# Patient Record
(demographics unavailable — no encounter records)

---

## 2018-01-02 NOTE — ER DOCUMENT REPORT
ED Headache





- General


Chief Complaint: Headache


Stated Complaint: HEADACHE


Time Seen by Provider: 01/02/18 11:22


Mode of Arrival: Ambulatory


Information source: Patient, [facility] Records


Notes: 





This 36-year-old female patient has history of migraines with hemiaplasia, 

hypertension, hyperlipidemia, coronary artery disease possibly, insulin-

dependent diabetes.


She reports 2 day history of severe headache with vomiting.  She states her 

blood sugars have been okay.


TRAVEL OUTSIDE OF THE [place] IN LAST 30 DAYS: No





- Related Data


Allergies/Adverse Reactions: 


 





No Known Allergies Allergy (Verified 01/02/18 10:52)


 











Past Medical History





- General


Information source: Patient, [facility] Records





- Social History


Smoking Status: Never Smoker


Cigarette use (# per day): No


Chew tobacco use (# tins/day): No


Smoking Education Provided: No


Frequency of alcohol use: None


Drug Abuse: None


Lives with: Family


Family History: Reviewed & Not Pertinent, Other - Mother had migraines


Patient has suicidal ideation: No


Patient has homicidal ideation: No





- Past Medical History


Cardiac Medical History: Reports: Hx Coronary Artery Disease, Hx 

Hypercholesterolemia, Hx Hypertension


Pulmonary Medical History: Reports: Hx Asthma


Neurological Medical History: Reports: Hx Cerebrovascular Accident


Endocrine Medical History: Reports: Hx Diabetes Mellitus Type 1


Renal/ Medical History: Reports: None


GI Medical History: Reports: None


Musculoskeltal Medical History: Reports None


Skin Medical History: Reports Hx Eczema


Psychiatric Medical History: Reports: None


Past Surgical History: Reports: Hx Oral Surgery





- Immunizations


Immunizations up to date: Yes


Hx Diphtheria, Pertussis, Tetanus Vaccination: Yes - 2011


Hx Pneumococcal Vaccination: 01/01/01





Review of Systems





- Review of Systems


Constitutional: No symptoms reported


EENT: No symptoms reported


Cardiovascular: No symptoms reported


Respiratory: No symptoms reported


Gastrointestinal: See HPI, Vomiting - Vomiting related to the headache


Genitourinary: No symptoms reported


Female Genitourinary: No symptoms reported


Musculoskeletal: No symptoms reported


Skin: Other - Does have some eczema


Hematologic/Lymphatic: No symptoms reported


Neurological/Psychological: Headaches





Physical Exam





- Vital signs


Vitals: 


 











Temp Pulse Resp BP Pulse Ox


 


 98.6 F   106 H  16   143/93 H  98 


 


 01/02/18 10:56  01/02/18 10:56  01/02/18 10:56  01/02/18 10:56  01/02/18 10:56











Interpretation: Normal





- HEENT


Head: Normocephalic, Atraumatic, Tenderness - Some tenderness to palpate the 

frontal and temporal scalp


Eyes: Normal


Pupils: PERRL


Neck: Supple, Other - There is minimal tenderness to palpate the posterior 

cervical muscles





- Respiratory


Respiratory status: No respiratory distress


Breath sounds: Normal





- Cardiovascular


Rhythm: Regular


Heart sounds: Normal auscultation


Murmur: No





- Abdominal


Inspection: Normal





- Back


Back: Normal





- Extremities


General upper extremity: Normal inspection


General lower extremity: Normal inspection





- Neurological


Neuro grossly intact: Yes





- Psychological


Associated symptoms: Normal affect, Normal mood





Course





- Re-evaluation


Re-evalutation: 





01/02/18 12:37


Patient is much more comfortable now reports her headache has eased off.


She will be given a prescription for Compazine and instructions about taking 

Benadryl, Compazine, and 2 Aleve with large glass of water and perhaps some 

caffeine next time she gets a headache like this.





- Vital Signs


Vital signs: 


 











Temp Pulse Resp BP Pulse Ox


 


 98.6 F   106 H  16   143/93 H  98 


 


 01/02/18 10:56  01/02/18 10:56  01/02/18 10:56  01/02/18 10:56  01/02/18 10:56














Discharge





- Discharge


Clinical Impression: 


Migraine headache


Qualifiers:


 Migraine type: unspecified Status migrainosus presence: with status 

migrainosus Intractability: not intractable Qualified Code(s): G43.901 - 

Migraine, unspecified, not intractable, with status migrainosus





Condition: Stable


Disposition: HOME, SELF-CARE


Additional Instructions: 


Migraine Headache:





     The physician feels that your symptoms are due to a migraine attack.  

Migraines are caused by changes in the blood vessels of the head.  Arteries go 

into spasm, often causing warning symptoms that a headache may begin soon.  As 

the spasm goes away, the vessels dilate and throb, causing the pounding pain of 

a migraine headache. Migraines often cause nausea and vomiting.


     The treatment of headaches varies with severity and cause of pain. Not all 

headaches need pain shots -- in fact, there is evidence that using narcotics 

for headaches may make them worse in the long run.  The physician will 

determine the therapy that's in your best interest for this particular headache.


     Medications are available that may prevent migraines, or stop them as they 

first occur.  If one medication is not helpful, try another.  If migraines are 

frequent, be patient -- follow the doctor's recommendations.


     Call the physician if you are worsening, or if new symptoms arise.











////////////////////////////////////////////////////////////////////////////////

////////////////////////////////////////////////////////////





Drink plenty of fluids today and get plenty of rest.





Take the Compazine as prescribed if needed for headache along with 1 or 2 

Benadryl tablets, 2 Aleve tablets and some caffeine perhaps from a cup of 

coffee or soda or tea to treat the headache if it returns.





Follow-up with local medical doctor if not improving.





RETURN TO THE EMERGENCY ROOM IF ANY NEW OR WORSENING SYMPTOMS.








Prescriptions: 


Prochlorperazine Maleate [Compazine 10 mg Tablet] 10 mg PO ASDIR PRN #10 tablet


 PRN Reason:

## 2018-01-04 NOTE — ER DOCUMENT REPORT
ED Medical Screen (RME)





- General


Chief Complaint: Headache >24 hrs old


Stated Complaint: HEADACHE


Time Seen by Provider: 01/04/18 14:21


TRAVEL OUTSIDE OF THE [place] IN LAST 30 DAYS: No





- HPI


Patient complains to provider of: Headache 10/10


Notes: 





01/04/18 14:24


Fortunately young woman with history of stroke presents with 10/10 headache.  

She had a migraine that started 2 days ago was seen in our emergency 

department.  Was given medications here and released improved.  Returns again 

with headache throbbing in nature nausea vomiting no diarrhea no head trauma.





No focal motor defects.





- Related Data


Allergies/Adverse Reactions: 


 





No Known Allergies Allergy (Verified 01/04/18 13:59)


 











Past Medical History





- Past Medical History


Cardiac Medical History: Reports: Hx Coronary Artery Disease, Hx 

Hypercholesterolemia, Hx Hypertension


Pulmonary Medical History: Reports: Hx Asthma


Neurological Medical History: Reports: Hx Cerebrovascular Accident


Endocrine Medical History: Reports: Hx Diabetes Mellitus Type 1


Renal/ Medical History: Denies: Hx Peritoneal Dialysis


Skin Medical History: Reports Hx Eczema


Past Surgical History: Reports: Hx Oral Surgery.  Denies: Hx Hysterectomy





- Immunizations


Immunizations up to date: Yes


Hx Diphtheria, Pertussis, Tetanus Vaccination: Yes - 2011





Review of Systems





- Review of Systems


Gastrointestinal: Nausea, Vomiting


Neurological/Psychological: Headaches





Physical Exam





- Vital signs


Vitals: 





 











Temp Pulse Resp BP Pulse Ox


 


 99.1 F   106 H  14   156/99 H  100 


 


 01/04/18 13:58  01/04/18 13:58  01/04/18 13:58  01/04/18 13:58  01/04/18 13:58














Course





- Re-evaluation


Re-evalutation: 





01/04/18 14:26


Patient with history of stroke presents with 10/10 headache, nausea vomiting.





- Vital Signs


Vital signs: 





 











Temp Pulse Resp BP Pulse Ox


 


 99.1 F   106 H  14   156/99 H  100 


 


 01/04/18 13:58  01/04/18 13:58  01/04/18 13:58  01/04/18 13:58  01/04/18 13:58

## 2018-01-04 NOTE — ER DOCUMENT REPORT
ED Headache





- General


Mode of Arrival: Ambulatory


Information source: Patient


TRAVEL OUTSIDE OF THE [place] IN LAST 30 DAYS: No





- HPI


Patient complains to provider of: Headache


Patient reports: Occasional migraines, Prior CVA


Onset: This afternoon


Onset was: Gradual


Timing: Still present


Quality of pain: Sharp, Throbbing


Pain Level: 5


Associated symptoms: Nausea/vomiting.  denies: Confusion, Double/blurred vision

, Fever, Lightheaded, Neck pain, Stiff neck, Trouble walking


Exacerbated by: denies: Light, Noise


Similar symptoms previously: Yes


Recently seen / treated by doctor: Yes





<KEENA SCHAFER - Last Filed: 01/04/18 19:24>





<ANGEL NAVARRETE - Last Filed: 01/04/18 20:35>





- General


Chief Complaint: Headache >24 hrs old


Stated Complaint: HEADACHE


Time Seen by Provider: 01/04/18 14:21


Notes: 





Patient complains of right-sided headache pain that started 2 days ago.  

Patient was seen here for this at that time and received pain medication.  

Patient states that headache pain resolved and then returned this afternoon.  

Patient does report nausea with vomiting 4 episodes today.  Patient denies any 

fever or head injury.  Patient's mother is concerned that patient has a history 

of stroke in the past and that with her previous strokes 4 she always had 

headache pain.  Mother states that patient has been followed by multiple 

providers at [facility], [place], [facility] as well as [facility].  Patient states that some people say she had strokes, mother states 

that other providers that she did not have strokes, patient's mother states 

that patient is currently being treated for MS, but she does not believe that 

patient has this.  Mother states that patient's had chronic body pains as well 

as weakness due to her strokes in the past. (KEENA SCHAFER)





- Related Data


Allergies/Adverse Reactions: 


 





No Known Allergies Allergy (Verified 01/04/18 13:59)


 











Past Medical History





- General


Information source: Patient, Parent





- Social History


Smoking Status: Current Every Day Smoker


Frequency of alcohol use: None


Drug Abuse: None


Occupation: Call center


Lives with: Family


Family History: Reviewed & Not Pertinent, Other - Mother had migraines


Patient has suicidal ideation: No


Patient has homicidal ideation: No





- Past Medical History


Cardiac Medical History: Reports: Hx Coronary Artery Disease, Hx 

Hypercholesterolemia, Hx Hypertension


Pulmonary Medical History: Reports: Hx Asthma


Neurological Medical History: Reports: Hx Cerebrovascular Accident, Hx Migraine


Endocrine Medical History: Reports: Hx Diabetes Mellitus Type 1


Renal/ Medical History: Denies: Hx Peritoneal Dialysis


Skin Medical History: Reports Hx Eczema


Past Surgical History: Reports: Hx Oral Surgery.  Denies: Hx Hysterectomy





- Immunizations


Immunizations up to date: Yes


Hx Diphtheria, Pertussis, Tetanus Vaccination: Yes - 2011


Hx Pneumococcal Vaccination: 01/01/01





<HANHKEENA MONTERO - Last Filed: 01/04/18 19:24>





Review of Systems





- Review of Systems


Constitutional: No symptoms reported.  denies: Fever, Recent illness


EENT: No symptoms reported.  denies: Blurred vision


Cardiovascular: No symptoms reported.  denies: Chest pain, Dizziness


Respiratory: No symptoms reported


Gastrointestinal: Nausea, Vomiting.  denies: Abdominal pain


Genitourinary: No symptoms reported


Female Genitourinary: No symptoms reported


Musculoskeletal: No symptoms reported.  denies: Back pain, Neck pain


Skin: No symptoms reported.  denies: Rash


Hematologic/Lymphatic: No symptoms reported


Neurological/Psychological: Weakness - Chronic after having prior CVAs, 

Headaches.  denies: Confusion, Lost consciousness





<HANH,ESPERANZADONALDO - Last Filed: 01/04/18 19:24>





Physical Exam





- General


General appearance: Alert


In distress: Mild





- HEENT


Head: Normocephalic, Atraumatic.  No: [place]'s eyes, Tenderness


Eyes: Normal


Conjunctiva: Normal


Extraocular movements intact: Yes


Eyelashes: Normal


Pupils: PERRL


Ears: Normal


External canal: Normal


Tympanic membrane: Normal


Sinus: Normal


Nasal: Normal


Mouth/Lips: Normal


Mucous membranes: Normal


Pharynx: Normal.  No: Erythema


Neck: Normal, Supple.  No: Lymphadenopathy, Meningismus





- Respiratory


Respiratory status: No respiratory distress


Chest status: Nontender


Breath sounds: Normal.  No: Rales, Rhonchi, Stridor, Wheezing


Chest palpation: Normal





- Cardiovascular


Rhythm: Regular


Heart sounds: S1 appreciated, S2 appreciated


Murmur: No





- Abdominal


Inspection: Normal





- Back


Back: Normal, Nontender.  No: CVA tenderness





- Extremities


General upper extremity: Normal inspection, Normal strength


General lower extremity: Normal inspection, Normal strength





- Neurological


Neuro grossly intact: Yes


Cognition: Normal


Orientation: AAOx4


[facility] Coma Scale Eye Opening: Spontaneous


[place] Coma Scale Verbal: Oriented


[place] Coma Scale Motor: Obeys Commands


[facility] Coma Scale Total: 15


Speech: Normal.  No: Dysarthria


Cranial nerves: Normal.  No: Facial palsy, Tongue deviation


Cerebellar coordination: Normal, Heel-shin, Rapid alt. movements


Motor strength normal: LUE, RUE, LLE, RLE





- Psychological


Associated symptoms: Normal affect, Normal mood





- Skin


Skin Temperature: Warm


Skin Moisture: Dry


Skin Color: Normal





<KEENA SCHAFER - Last Filed: 01/04/18 19:24>





- Vital signs


Vitals: 





 











Temp Pulse Resp BP Pulse Ox


 


 99.1 F   106 H  14   156/99 H  100 


 


 01/04/18 13:58  01/04/18 13:58  01/04/18 13:58  01/04/18 13:58  01/04/18 13:58














Course





- Laboratory


Result Diagrams: 


 01/04/18 14:45





 01/04/18 16:53





- Diagnostic Test


Radiology reviewed: Reports reviewed





<KEENA SCHAFER - Last Filed: 01/04/18 19:24>





- Laboratory


Result Diagrams: 


 01/04/18 14:45





 01/04/18 16:53





<ANGEL NAVARRETE - Last Filed: 01/04/18 20:35>





- Re-evaluation


Re-evalutation: 





01/04/18 17:03


Consulted with Dr. Little regarding patient presentation reviewed patient's CT 

report findings.  Does not recommend any additional imaging given normal 

neurologic exam.  Recommend outpatient follow-up with neurologist


01/04/18 18:05


Patient reports that headache pain is starting to improve.  RN states that 

patient is now starting to develop a fever.  Additional medications ordered.  

Discussed patient's hyperglycemia.  Mother states that patient has not had any 

of her diabetic medications today.


01/04/18 18:26


Dr Little bedside for examination.  No concern for meningitis at this time, no 

meningismus.  Negative Kernig and Brudzinski.  Agrees with plan for IV 

hydration as well as checking a urinalysis and flu test at this time.


01/04/18 18:30





01/04/18 19:24


Bedside report and handout given to Angel DOMINIQUE (KEENA SCHAFER)








01/04/18 20:30


Patient alert, well-appearing, states she feels much better.  She is asking for 

a single dose of something so she can go home and rest, she denies any 

significant headache, she denies any neck stiffness, she denies any current 

symptoms other than feeling tired.  Blood glucose on the 300s, patient given 

hydration, she has insulin and ability to check and self treat at home, anion 

gap and bicarbonate are normal.  Urine does not show infection, influenza 

negative, chest x-ray does not show pneumonia, no sore throat, soft abdomen, 

probably viral source of infection.  No meningismus suggesting meningitis.  No 

current symptoms suggesting meningitis.  Patient states she will follow closely 

with her neurologist, discussed fever treatment, blood glucose treatment, 

headache treatment, and return precautions.  Patient and mom state satisfaction 

and agreement. (ANGEL NAVARRETE)





- Vital Signs


Vital signs: 





 











Temp Pulse Resp BP Pulse Ox


 


 100.4 F   107 H  18   128/78 H  96 


 


 01/04/18 20:13  01/04/18 20:13  01/04/18 20:13  01/04/18 20:13  01/04/18 20:13














- Laboratory


Laboratory results interpreted by me: 





 











  01/04/18 01/04/18 01/04/18





  14:45 16:53 18:50


 


WBC  11.9 H  


 


Seg Neutrophils %  81.4 H  


 


Absolute Neutrophils  9.7 H  


 


Sodium   135.7 L 


 


Glucose   343 H 


 


POC Glucose   


 


Creatine Kinase   28 L 


 


Urine Glucose (UA)    >=500 H


 


Urine Ketones    20 H














  01/04/18





  20:02


 


WBC 


 


Seg Neutrophils % 


 


Absolute Neutrophils 


 


Sodium 


 


Glucose 


 


POC Glucose  348 H


 


Creatine Kinase 


 


Urine Glucose (UA) 


 


Urine Ketones 














Discharge





<KEENA SCHAFER - Last Filed: 01/04/18 19:24>





<ANGEL NAVARRETE - Last Filed: 01/04/18 20:35>





- Discharge


Clinical Impression: 


 Hx of diabetes mellitus





Headache


Qualifiers:


 Headache type: unspecified Headache chronicity pattern: episodic headache 

Intractability: not intractable Qualified Code(s): R51 - Headache





Fever


Qualifiers:


 Fever type: unspecified Qualified Code(s): R50.9 - Fever, unspecified





Instructions:  Acetaminophen, Fever ([facility]), Headache ([facility]), Hyperglycemia ([facility]), 

Toradol Injection ([facility]), Viral Syndrome ([facility])


Additional Instructions: 


Return immediately for any new or worsening symptoms





Followup with your primary care provider, call tomorrow to make a followup 

appointment





Follow-up with your neurologist for further evaluation, call tomorrow for an 

appointment





Stay well-hydrated





Take your diabetic medications as prescribed








Prescriptions: 


Butalb/Acetaminophen/Caffeine [Fioricet (-40 mg) Tablet] 1 - 2 tab PO Q4H 

PRN #12 each


 PRN Reason: 


Naproxen [Naprosyn 250 Nmg Tablet] 1 tab PO BID #14 tablet


Forms:  Return to Work


Referrals: 


SAVANAH MCCORD MD [Primary Care Provider] - Follow up tomorrow

## 2018-01-04 NOTE — RADIOLOGY REPORT (SQ)
EXAM DESCRIPTION:  CT HEAD WITHOUT



COMPLETED DATE/TIME:  1/4/2018 3:44 pm



REASON FOR STUDY:  HA



COMPARISON:  Prior studies from 2016 and 2015.  Most recent prior is dated 11/20/2016.



TECHNIQUE:  Axial images acquired through the brain without intravenous contrast.  Images reviewed wi
th bone, brain and subdural windows.  Images stored on PACS.

All CT scanners at this facility use dose modulation, iterative reconstruction, and/or weight based d
osing when appropriate to reduce radiation dose to as low as reasonably achievable (ALARA).

[facility]: Dose Right  [facility]: CareDose    [facility]: Dose Right    [facility]: Teradose 4D    [facility]: [employer]



RADIATION DOSE:  CT Rad equipment meets quality standard of care and radiation dose reduction techniq
ues were employed. CTDIvol: 67.0 mGy. DLP: 1316 mGy-cm. mGy.



LIMITATIONS:  None.



FINDINGS:  VENTRICLES: Normal size and contour.

CEREBRUM: Stable patchy areas of periventricular low density.  Nonprogressive over time. No developin
g mass or hemorrhage or shift or hydrocephalus.

CEREBELLUM: No masses.  No hemorrhage.  No alteration of density.  No evidence for acute infarction.

EXTRAAXIAL SPACES: No fluid collections.  No masses.

ORBITS AND GLOBE: No intra- or extraconal masses.  Normal contour of globe without masses.

CALVARIUM: No fracture.

PARANASAL SINUSES: No fluid or mucosal thickening.

SOFT TISSUES: No mass or hematoma.

OTHER: No other significant finding.



IMPRESSION:  1. Nonprogressive white matter changes, chronic.  No acute or suspicious abnormality.  S
table appearance of the brain.

EVIDENCE OF ACUTE STROKE: NO.



COMMENT:  Quality ID # [id number]: Final reports with documentation of one or more dose reduction techniques
 (e.g., Automated exposure control, adjustment of the mA and/or kV according to patient size, use of 
iterative reconstruction technique)



TECHNICAL DOCUMENTATION:  JOB ID:  [id number]

 2011 [employer]- All Rights Reserved

## 2018-01-04 NOTE — RADIOLOGY REPORT (SQ)
EXAM DESCRIPTION:  CHEST SINGLE VIEW



COMPLETED DATE/TIME:  1/4/2018 3:48 pm



REASON FOR STUDY:  HA, hx stroke



COMPARISON:  11/28/2015



EXAM PARAMETERS:  NUMBER OF VIEWS: One view.

TECHNIQUE: Single frontal radiographic view of the chest acquired.

RADIATION DOSE: NA

LIMITATIONS: None.



FINDINGS:  LUNGS AND PLEURA: No opacities, masses or pneumothorax. No pleural effusion.

MEDIASTINUM AND HILAR STRUCTURES: No masses.  Contour normal.

HEART AND VASCULAR STRUCTURES: Heart normal in size.  Normal vasculature.

BONES: No acute findings.

HARDWARE: None in the chest.

OTHER: No other significant finding.



IMPRESSION:  NO ACUTE RADIOGRAPHIC FINDING IN THE CHEST.



TECHNICAL DOCUMENTATION:  JOB ID:  [id number]

 2011 [employer]- All Rights Reserved

## 2018-01-05 NOTE — EKG REPORT
SEVERITY:- BORDERLINE ECG -

SINUS TACHYCARDIA

BORDERLINE T ABNORMALITIES, INFERIOR LEADS

:

Confirmed by: Carmenza Shannon 05-Jan-2018 09:22:23

## 2018-04-26 NOTE — ER DOCUMENT REPORT
ED Medical Screen (RME)





- General


Chief Complaint: Weakness


Stated Complaint: WEAKNESS


Time Seen by Provider: 04/26/18 22:02


Notes: 


Patient is a 37 year old female that comes to the ED for chief complaint of 

weakness in her left arm and leg.  She also reports some numbness.  She states 

that she actually fell in a store earlier because of the weakness.  Symptoms 

started at 2 PM, 8 hours ago, patient states that she both has a diagnosis of 

multiple sclerosis and has had 4 strokes, follows with [facility].  Used to 

be on a blood thinner, not any longer, not currently being treated for multiple 

sclerosis because of insurance difficulty, also has type 1 diabetes.





TRAVEL OUTSIDE OF THE [place] IN LAST 30 DAYS: No





- Related Data


Allergies/Adverse Reactions: 


 





No Known Allergies Allergy (Verified 01/04/18 13:59)


 











Past Medical History





- Past Medical History


Cardiac Medical History: Reports: Hx Coronary Artery Disease, Hx 

Hypercholesterolemia, Hx Hypertension


Pulmonary Medical History: Reports: Hx Asthma


Neurological Medical History: Reports: Hx Cerebrovascular Accident, Hx Migraine


Endocrine Medical History: Reports: Hx Diabetes Mellitus Type 1


Renal/ Medical History: Denies: Hx Peritoneal Dialysis


Skin Medical History: Reports Hx Eczema


Past Surgical History: Reports: Hx Oral Surgery.  Denies: Hx Hysterectomy





- Immunizations


Immunizations up to date: Yes


Hx Diphtheria, Pertussis, Tetanus Vaccination: Yes - 2011





Physical Exam





- Extremities


Notes: 


There is weakness which is slight in both left upper and lower extremity, equal 

, otherwise unremarkable neurological exam.

## 2018-04-27 NOTE — EKG REPORT
SEVERITY:- BORDERLINE ECG -

SINUS TACHYCARDIA

BORDERLINE T ABNORMALITIES, INFERIOR LEADS

:

Confirmed by: Tc Jacques MD 27-Apr-2018 07:50:29

## 2018-04-27 NOTE — ER DOCUMENT REPORT
ED Neuro Symptoms/Deficit





- General


Mode of Arrival: Ambulatory


Information source: Patient


TRAVEL OUTSIDE OF THE [place] IN LAST 30 DAYS: No





<KATERIN COLLINS - Last Filed: 04/27/18 01:52>





<RENEE OROPEZA - Last Filed: 04/27/18 02:45>





- General


Chief Complaint: Weakness


Stated Complaint: WEAKNESS


Time Seen by Provider: 04/26/18 22:02


Notes: 





Patient is a 37-year-old female with a diagnosis of multiple sclerosis in 2017 

who presents to the emergency department today with left-sided weakness 

beginning a day and a half ago.  Patient states her weakness "comes and goes".  

Patient states that her left leg began to "buckle" when walking.  Patient 

states she was at a store buying soda and when walking to the register she fell 

secondary to left-sided weakness.  Patient states her left arm has also began 

to feel weak at times. Patient states the weakness starts "in her toes and goes 

up to her arm".  Patient states she has been off of her multiple sclerosis 

medications for 3 weeks because she was "going to start infusions" however "

[place] has not yet started these and her next appointment is not until May". 

Patient mentions that she has had x4 CVA's in the past but she has not been on 

blood thinning medications for two years. (KATERIN COLLINS)





- Related Data


Allergies/Adverse Reactions: 


 





No Known Allergies Allergy (Verified 01/04/18 13:59)


 











Past Medical History





- General


Information source: Patient





- Social History


Smoking Status: Never Smoker


Cigarette use (# per day): No


Frequency of alcohol use: None


Drug Abuse: None


Lives with: Family


Family History: Reviewed & Not Pertinent, Other - Mother had migraines





- Past Medical History


Cardiac Medical History: Reports: Hx Coronary Artery Disease, Hx 

Hypercholesterolemia, Hx Hypertension


Pulmonary Medical History: Reports: Hx Asthma


Neurological Medical History: Reports: Hx Cerebrovascular Accident, Hx Migraine

, Other - Hx of MS


Endocrine Medical History: Reports: Hx Diabetes Mellitus Type 1


Skin Medical History: Reports Hx Eczema


Past Surgical History: Reports: Hx Oral Surgery





- Immunizations


Immunizations up to date: Yes


Hx Diphtheria, Pertussis, Tetanus Vaccination: Yes - 2011


Hx Pneumococcal Vaccination: 01/01/01





<KATERIN COLLINS - Last Filed: 04/27/18 01:52>





Review of Systems





- Review of Systems


Constitutional: No symptoms reported


EENT: No symptoms reported


Cardiovascular: No symptoms reported


Respiratory: No symptoms reported


Gastrointestinal: No symptoms reported


Genitourinary: No symptoms reported


Female Genitourinary: No symptoms reported


Musculoskeletal: No symptoms reported


Skin: No symptoms reported


Hematologic/Lymphatic: No symptoms reported


Neurological/Psychological: See HPI, Weakness - Left arm/leg


-: Yes All other systems reviewed and negative





<KATERIN COLLINS - Last Filed: 04/27/18 01:52>





Physical Exam





- Vital signs


Interpretation: Normal





- General


General appearance: Appears well, Alert





- HEENT


Head: Normocephalic, Atraumatic


Eyes: Normal


Pupils: PERRL





- Respiratory


Respiratory status: No respiratory distress


Chest status: Nontender


Breath sounds: Normal


Chest palpation: Normal





- Cardiovascular


Rhythm: Regular


Heart sounds: Normal auscultation


Murmur: No





- Abdominal


Inspection: Normal


Distension: No distension


Bowel sounds: Normal


Tenderness: Nontender


Organomegaly: No organomegaly





- Back


Back: Normal, Nontender





- Extremities


General upper extremity: Normal inspection, Nontender, Normal color, Normal ROM

, Normal temperature


General lower extremity: Normal inspection, Nontender, Normal color, Normal ROM

, Normal temperature, Normal weight bearing.  No: Nel's sign





- Neurological


Neuro grossly intact: Yes


Cognition: Normal


Orientation: AAOx4


[place] Coma Scale Eye Opening: Spontaneous


[facility] Coma Scale Verbal: Oriented


[facility] Coma Scale Motor: Obeys Commands


[facility] Coma Scale Total: 15


Speech: Normal


Motor strength normal: LUE, RUE, LLE, RLE


Additional motor exam normals: Equal , Dorsiflexion.  No: Pronator drift


Sensory: Normal





- Psychological


Associated symptoms: Normal affect, Normal mood





- Skin


Skin Temperature: Warm


Skin Moisture: Dry


Skin Color: Normal





<RENEE OROPEZA - Last Filed: 04/27/18 02:45>





- Vital signs


Vitals: 





 











Temp Pulse Resp BP Pulse Ox


 


 98.0 F   119 H  20   145/96 H  98 


 


 04/26/18 22:12  04/26/18 22:12  04/26/18 22:12  04/26/18 22:12  04/26/18 22:12














Course





- Laboratory


Result Diagrams: 


 04/26/18 22:41





 04/26/18 22:41





<KATERIN COLLINS - Last Filed: 04/27/18 01:52>





- Laboratory


Result Diagrams: 


 04/26/18 22:41





 04/26/18 22:41





<RENEE OROPEZA - Last Filed: 04/27/18 02:45>





- Re-evaluation


Re-evalutation: 





04/27/18 02:42


Patient with elevated blood sugar.  No evidence for DKA.  Patient has had 

waxing and waning symptoms.  No acute findings on CT.  She has been discussed 

with neurology at [facility],Dr. Bianca Hilton, who does not recommend an MRI at 

this time.  He is going to message her immune own neurologist to get her in 

sooner than May 21.  Would not start steroids or any other medications at this 

plan.  Patient would prefer not to be transferred this evening.  She is to 

expect a call from [facility] and call for follow-up if she does not hear from them 

in the next few days.  Understands and agrees with plan.  Neurovascularly 

intact at the time of discharge with a repeat blood sugar that is decreased.  

Stable for discharge. (RENEE OROPEZA)





- Vital Signs


Vital signs: 





 











Temp Pulse Resp BP Pulse Ox


 


 98.9 F   119 H  17   136/93 H  99 


 


 04/27/18 00:22  04/26/18 22:12  04/27/18 02:01  04/27/18 02:00  04/27/18 02:01














- Laboratory


Laboratory results interpreted by me: 





 











  04/26/18 04/26/18 04/27/18





  22:41 22:41 01:59


 


WBC  12.0 H  


 


Sodium   132.2 L 


 


Chloride   94 L 


 


BUN   5 L 


 


Creatinine   0.48 L 


 


Glucose   409 H* 


 


POC Glucose    306 H


 


Creatine Kinase   24 L 














Discharge





<KATERIN COLLINS - Last Filed: 04/27/18 01:52>





<RENEE OROPEZA - Last Filed: 04/27/18 02:45>





- Discharge


Clinical Impression: 


 Exacerbation of multiple sclerosis, Hyperglycemia due to type 1 diabetes 

mellitus





Condition: Stable


Disposition: HOME, SELF-CARE


Instructions:  Hyperglycemia ([facility])


Additional Instructions: 


Please follow-up with your neurologist at [facility].  I have spoken to the service 

and they should be contacting you for an earlier appointment later today.  

Please return if you have any worsening or concerning symptoms.


Referrals: 


DORA ARNOLD,  [Primary Care Provider] - Follow up in 3-5 days


Scribe Attestation: 





04/27/18 02:45


I personally performed the services described in the documentation, reviewed 

and edited the documentation which was dictated to the scribe in my presence, 

and it accurately records my words and actions. (RENEE OROPEZA)





Scribe Documentation





- Scribe


Written by Scribe:: Coco Brandt, 4/27/2018 0206


acting as scribe for :: [place]





<KATERIN COLLINS - Last Filed: 04/27/18 01:52>

## 2018-04-27 NOTE — RADIOLOGY REPORT (SQ)
EXAM DESCRIPTION:

CT HEAD WITHOUT



CLINICAL HISTORY:

37 years Female, left arm and leg weakness; hx stroke   MS



COMPARISON:

Reports only: MRI, 2/25/2015. CT, 11/28/2015, March 24, 2016,

11/20/2016.



TECHNIQUE:

No contrast.  Coronal and sagittal reformat.  This exam was

performed according to our departmental dose-optimization

program, which includes automated exposure control, adjustment of

the mA and/or kV according to patient size and/or use of

iterative reconstruction technique.



FINDINGS:

Mild diminished white matter density including focal 0.6 cm

diminished density in the left periventricular white matter,

image 29 of series 2 consistent with prior exam reports. No

hemorrhage. No mass effect and no midline shift. Extra-axial

structures appear otherwise grossly intact.



Impression: No acute findings. Chronic white matter disease

pattern.

## 2018-04-27 NOTE — RADIOLOGY REPORT (SQ)
EXAM DESCRIPTION:

CHEST SINGLE VIEW



CLINICAL HISTORY:

37 years Female, left arm and leg weakness; hx stroke



COMPARISON:

1.4.18



NUMBER OF VIEWS/TECHNIQUE:

1/AP



FINDINGS:



Adequate lung volume, clear parenchyma, normal cardiac

silhouette, and intact bony thorax.



IMPRESSION:



No acute cardiopulmonary findings.